# Patient Record
(demographics unavailable — no encounter records)

---

## 2025-01-17 NOTE — DISCUSSION/SUMMARY
[Medication Risks Reviewed] : Medication risks reviewed [Surgical risks reviewed] : Surgical risks reviewed [de-identified] : reviewed the case and the imaging with the patient  cervical and lumbar  cervical bulges  lumbar L5-S1  discussion of the condition and treatment options cautions discussed questions answered discussion of natural history of the condition and what the next step would be  injection if needed PT

## 2025-01-17 NOTE — HISTORY OF PRESENT ILLNESS
[3] : 3 [2] : 2 [Dull/Aching] : dull/aching [Sharp] : sharp [Rest] : rest [Full time] : Work status: full time [de-identified] : 9/6/2024: 45 yo RHD  M -pt is here for neck pain starting 14+ years ago after playing rugby.   pt believes with was a stinger with a sharp pain shooting to left shoulder. denies any tingling/numbness in fingertips.   has never been treated for neck before. pt has occasional flare ups of the neck with onsets of pain and limited mobility.    lower back 26 years ago from shoveling. pt has a sudden onset of pain.  pt can throw out his back randomly over last 10 years.  most recent flare up was around 2 months.  pt never was treated for lower back pain.   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 States his neck has mild stiffness. Pain levels are moderate. 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